# Patient Record
Sex: FEMALE | Race: BLACK OR AFRICAN AMERICAN | NOT HISPANIC OR LATINO | URBAN - METROPOLITAN AREA
[De-identification: names, ages, dates, MRNs, and addresses within clinical notes are randomized per-mention and may not be internally consistent; named-entity substitution may affect disease eponyms.]

---

## 2024-10-03 ENCOUNTER — OFFICE VISIT (OUTPATIENT)
Age: 39
End: 2024-10-03

## 2024-10-03 VITALS
WEIGHT: 293 LBS | BODY MASS INDEX: 44.41 KG/M2 | HEIGHT: 68 IN | SYSTOLIC BLOOD PRESSURE: 130 MMHG | DIASTOLIC BLOOD PRESSURE: 90 MMHG

## 2024-10-03 DIAGNOSIS — Z80.3 FAMILY HISTORY OF BREAST CANCER: ICD-10-CM

## 2024-10-03 DIAGNOSIS — Z11.3 SCREENING FOR STDS (SEXUALLY TRANSMITTED DISEASES): ICD-10-CM

## 2024-10-03 DIAGNOSIS — Z11.51 SCREENING FOR HUMAN PAPILLOMAVIRUS (HPV): ICD-10-CM

## 2024-10-03 DIAGNOSIS — E55.9 VITAMIN D DEFICIENCY: ICD-10-CM

## 2024-10-03 DIAGNOSIS — Z01.419 ENCOUNTER FOR ANNUAL ROUTINE GYNECOLOGICAL EXAMINATION: Primary | ICD-10-CM

## 2024-10-03 DIAGNOSIS — N95.1 HOT FLUSHES, PERIMENOPAUSAL: ICD-10-CM

## 2024-10-03 DIAGNOSIS — N93.9 ABNORMAL UTERINE BLEEDING (AUB): ICD-10-CM

## 2024-10-03 DIAGNOSIS — Z00.00 GENERAL MEDICAL EXAM: ICD-10-CM

## 2024-10-03 DIAGNOSIS — Z12.4 SCREENING FOR CERVICAL CANCER: ICD-10-CM

## 2024-10-03 DIAGNOSIS — Z80.3 FAMILY HISTORY OF BREAST CANCER IN MOTHER: ICD-10-CM

## 2024-10-03 LAB
EXTERNAL CHLAMYDIA RESULT: NEGATIVE
N GONORRHOEA RRNA SPEC QL PROBE: NEGATIVE

## 2024-10-03 RX ORDER — CETIRIZINE HYDROCHLORIDE 10 MG/1
10 TABLET ORAL DAILY
COMMUNITY

## 2024-10-03 NOTE — PATIENT INSTRUCTIONS
Pap today, with STD testing.   For mammogram at age 41.  For colon CA screening at age 46.   For genetic screening.   Future fertility and AUB:  for fasting day 3 labs and for u/s the week after your period.  For consult with Dr. Tara Budinetz.   For f/u to go over labs and next step in care.    Patient verbalized understanding of today's discussion with all questions and concerns addressed to her satisfaction.

## 2024-10-03 NOTE — PROGRESS NOTES
What we talked about today:    Patient Instructions   Pap today, with STD testing.   For mammogram at age 41.  For colon CA screening at age 46.   For genetic screening.   Future fertility and AUB:  for fasting day 3 labs and for u/s the week after your period.  For consult with Dr. Tara Budinetz.   For f/u to go over labs and next step in care.    Patient verbalized understanding of today's discussion with all questions and concerns addressed to her satisfaction.            Assessment/Plan:    1. Encounter for annual routine gynecological examination  2. Screening for cervical cancer  -     Liquid-based pap, screening  3. Screening for human papillomavirus (HPV)  -     Liquid-based pap, screening  4. Family history of breast cancer in mother  -     Ambulatory Referral to Genetics; Future  5. Family history of breast cancer  -     Ambulatory Referral to Genetics; Future  6. Vitamin D deficiency  -     Vitamin D 25 hydroxy; Future  -     Vitamin D 25 hydroxy  7. Abnormal uterine bleeding (AUB)  -     TSH, 3rd generation with Free T4 reflex; Future  -     Testosterone; Future  -     Prolactin; Future  -     Progesterone; Future  -     Luteinizing hormone; Future  -     Insulin, fasting; Future  -     Hemoglobin A1C; Future  -     Follicle stimulating hormone; Future  -     Glucose, fasting; Future  -     Estrogens, total; Future  -     DHEA-sulfate; Future  -     hCG, quantitative; Future  -     Antimullerian hormone (AMH); Future  -     US pelvis complete w transvaginal; Future; Expected date: 10/03/2024  -     TSH, 3rd generation with Free T4 reflex  -     Testosterone  -     Prolactin  -     Progesterone  -     Luteinizing hormone  -     Hemoglobin A1C  -     Follicle stimulating hormone  -     Glucose, fasting  -     Estrogens, total  -     DHEA-sulfate  -     hCG, quantitative  -     Antimullerian hormone (AMH)  -     CBC and differential; Future  -     CBC and differential  8. Hot flushes, perimenopausal  -      TSH, 3rd generation with Free T4 reflex; Future  -     Testosterone; Future  -     Prolactin; Future  -     Progesterone; Future  -     Luteinizing hormone; Future  -     Insulin, fasting; Future  -     Hemoglobin A1C; Future  -     Follicle stimulating hormone; Future  -     Glucose, fasting; Future  -     Estrogens, total; Future  -     DHEA-sulfate; Future  -     hCG, quantitative; Future  -     Antimullerian hormone (AMH); Future  -     TSH, 3rd generation with Free T4 reflex  -     Testosterone  -     Prolactin  -     Progesterone  -     Luteinizing hormone  -     Hemoglobin A1C  -     Follicle stimulating hormone  -     Glucose, fasting  -     Estrogens, total  -     DHEA-sulfate  -     hCG, quantitative  -     Antimullerian hormone (AMH)  9. General medical exam  -     CBC and differential; Future  -     Comprehensive metabolic panel; Future  -     TIBC Panel (incl. Iron, TIBC, % Iron Saturation); Future  -     Ferritin; Future  -     Lipid panel; Future  -     CBC and differential  -     Comprehensive metabolic panel  -     Ferritin  -     Lipid panel          Subjective:      Patient ID: Ivania Diallo is a 39 y.o. female, who presents for an annual exam today.     HPI:    Pt states here for an annual.   Pt reports several times a month where she describes immobilizing pain, thinks it has worsened over the past several years.      Interested in preserving fertility and possible early menopause.  Periods are irregular and hot flushes are getting worse. Daily now with hot flushes.  Periods last 7-10 days.  Went as long as 45 days between periods.      Interested in surrogacy.       GYN History:    Patient's last menstrual period was 2024.  Period Duration (Days): 7-10  Period Pattern: (!) Irregular  Menstrual Flow: Heavy  Dysmenorrhea: (!) Severe  Dysmenorrhea Symptoms: Cramping, Headache  OB History          2    Para   1    Term   1            AB   1    Living   1         SAB   1    IAB      "   Ectopic        Multiple        Live Births   1                  Last pap smear: 2022, wnl with Dr. Jacey Headley.  History of abnormal paps: Yes, describe: >5yrs ago.      Smoking/alcohol/drug use. Yes, describe: occa EtOH.     Exercise:  Yes, describe: chasing after 10yo.     Sun exposure: mild, No sunscreen use.    Seat belt use:  Yes , appropriate counseling reviewed.      Last saw Family Physician:  Tyrell Mccarthy <6mos ago for physical.      Up to date with vaccines:  Yes , including covid vaccine, not sure if got Gardasil series.     Last mammogram: ~10yrs ago for family h/o and dense breast tissue.     Last colonoscopy: never    The following portions of the patient's history were reviewed and updated as appropriate: allergies, current medications, past family history, past medical history, past social history, past surgical history, and problem list.      Family history:      Family history   is  positive for breast, uterine, ovarian cancer, colon cancer, or melanoma skin cancer.    Other family history of cancers:  Yes, describe: mom diagnosed @ age 65 with DCIS and had a double mastectomy.   Pat aunt with breast CA.        Review of Systems   Constitutional: Negative.    HENT: Negative.     Respiratory: Negative.     Cardiovascular: Negative.    Gastrointestinal: Negative.    Endocrine: Negative.    Genitourinary: Negative.    Musculoskeletal: Negative.    Skin: Negative.    Allergic/Immunologic: Negative.    Neurological: Negative.    Hematological: Negative.    Psychiatric/Behavioral: Negative.     All other systems reviewed and are negative.            Objective:      /90   Ht 5' 7.5\" (1.715 m)   Wt (!) 162 kg (357 lb 6.4 oz)   LMP 09/17/2024   BMI 55.15 kg/m²        Physical Exam  Constitutional:       Appearance: She is obese.   HENT:      Head: Normocephalic and atraumatic.   Eyes:      Extraocular Movements: Extraocular movements intact.   Cardiovascular:      Rate and Rhythm: Normal rate and " regular rhythm.   Pulmonary:      Effort: Pulmonary effort is normal.      Breath sounds: Normal breath sounds.   Abdominal:      General: Bowel sounds are normal.      Palpations: Abdomen is soft.   Musculoskeletal:      Cervical back: Neck supple.   Skin:     General: Skin is warm.   Neurological:      Mental Status: She is alert and oriented to person, place, and time.   Psychiatric:         Mood and Affect: Mood normal.         Behavior: Behavior normal.         Thought Content: Thought content normal.         Judgment: Judgment normal.         Cardiac:  murmur appreciated No    Breast exam:  large breast bilaterally, no lumps or masses noted.     GYN exam: NEFG, No CMT, uterine, or adnexal tenderness to palpation.  Difficult to fully appreciate 2/2 body habitus.     Wetprep was not performed today.            FERMIN Santos MD, FACOG

## 2024-10-16 ENCOUNTER — TELEPHONE (OUTPATIENT)
Age: 39
End: 2024-10-16

## 2024-10-21 LAB — HBA1C MFR BLD HPLC: 6 %

## 2024-10-22 LAB
ALBUMIN SERPL-MCNC: 3.8 G/DL (ref 3.9–4.9)
ALP SERPL-CCNC: 91 IU/L (ref 44–121)
ALT SERPL-CCNC: 20 IU/L (ref 0–32)
AST SERPL-CCNC: 22 IU/L (ref 0–40)
BASOPHILS # BLD AUTO: 0.1 X10E3/UL (ref 0–0.2)
BASOPHILS NFR BLD AUTO: 1 %
BILIRUB SERPL-MCNC: <0.2 MG/DL (ref 0–1.2)
BUN SERPL-MCNC: 13 MG/DL (ref 6–20)
BUN/CREAT SERPL: 15 (ref 9–23)
CALCIUM SERPL-MCNC: 8.6 MG/DL (ref 8.7–10.2)
CHLORIDE SERPL-SCNC: 105 MMOL/L (ref 96–106)
CHOLEST SERPL-MCNC: 186 MG/DL (ref 100–199)
CHOLEST/HDLC SERPL: 5 RATIO (ref 0–4.4)
CO2 SERPL-SCNC: 23 MMOL/L (ref 20–29)
CREAT SERPL-MCNC: 0.89 MG/DL (ref 0.57–1)
EGFR: 85 ML/MIN/1.73
EOSINOPHIL # BLD AUTO: 0.6 X10E3/UL (ref 0–0.4)
EOSINOPHIL NFR BLD AUTO: 8 %
ERYTHROCYTE [DISTWIDTH] IN BLOOD BY AUTOMATED COUNT: 12.6 % (ref 11.7–15.4)
FERRITIN SERPL-MCNC: 17 NG/ML (ref 15–150)
GLOBULIN SER-MCNC: 2.4 G/DL (ref 1.5–4.5)
GLUCOSE SERPL-MCNC: 96 MG/DL (ref 70–99)
HCT VFR BLD AUTO: 37.9 % (ref 34–46.6)
HDLC SERPL-MCNC: 37 MG/DL
HGB BLD-MCNC: 11.8 G/DL (ref 11.1–15.9)
IMM GRANULOCYTES # BLD: 0 X10E3/UL (ref 0–0.1)
IMM GRANULOCYTES NFR BLD: 0 %
LDLC SERPL CALC-MCNC: 135 MG/DL (ref 0–99)
LYMPHOCYTES # BLD AUTO: 2 X10E3/UL (ref 0.7–3.1)
LYMPHOCYTES NFR BLD AUTO: 26 %
MCH RBC QN AUTO: 27.8 PG (ref 26.6–33)
MCHC RBC AUTO-ENTMCNC: 31.1 G/DL (ref 31.5–35.7)
MCV RBC AUTO: 89 FL (ref 79–97)
MONOCYTES # BLD AUTO: 0.6 X10E3/UL (ref 0.1–0.9)
MONOCYTES NFR BLD AUTO: 7 %
NEUTROPHILS # BLD AUTO: 4.6 X10E3/UL (ref 1.4–7)
NEUTROPHILS NFR BLD AUTO: 58 %
PLATELET # BLD AUTO: 354 X10E3/UL (ref 150–450)
POTASSIUM SERPL-SCNC: 4.3 MMOL/L (ref 3.5–5.2)
PROT SERPL-MCNC: 6.2 G/DL (ref 6–8.5)
RBC # BLD AUTO: 4.25 X10E6/UL (ref 3.77–5.28)
SL AMB VLDL CHOLESTEROL CALC: 14 MG/DL (ref 5–40)
SODIUM SERPL-SCNC: 140 MMOL/L (ref 134–144)
TRIGL SERPL-MCNC: 76 MG/DL (ref 0–149)
WBC # BLD AUTO: 7.9 X10E3/UL (ref 3.4–10.8)

## 2024-10-24 ENCOUNTER — TELEPHONE (OUTPATIENT)
Age: 39
End: 2024-10-24

## 2024-10-24 NOTE — TELEPHONE ENCOUNTER
"----- Message from FERMIN Santos MD sent at 10/24/2024  8:50 AM EDT -----  Any chance you can call pt to get her on the [id number] to make sending messages to her easier?    Elevated \"bad\" cholesterol. Otherwise, rest of labs pending.     //RMR    "

## 2024-10-26 LAB
25(OH)D3+25(OH)D2 SERPL-MCNC: 19.5 NG/ML (ref 30–100)
DHEA-S SERPL-MCNC: 123 UG/DL (ref 57.3–279.2)
EST. AVERAGE GLUCOSE BLD GHB EST-MCNC: 126 MG/DL
ESTROGEN SERPL-MCNC: 105 PG/ML
FSH SERPL-ACNC: 6.4 MIU/ML
GLUCOSE SERPL-MCNC: 96 MG/DL (ref 70–99)
HBA1C MFR BLD: 6 % (ref 4.8–5.6)
HCG INTACT+B SERPL-ACNC: <1 MIU/ML
LH SERPL-ACNC: 8.1 MIU/ML
MIS SERPL-MCNC: 2.67 NG/ML
PROGEST SERPL-MCNC: 0.2 NG/ML
PROLACTIN SERPL-MCNC: 33 NG/ML (ref 4.8–33.4)
TESTOST SERPL-MCNC: 19 NG/DL (ref 8–60)
TSH SERPL DL<=0.005 MIU/L-ACNC: 1.87 UIU/ML (ref 0.45–4.5)

## 2024-10-31 ENCOUNTER — HOSPITAL ENCOUNTER (OUTPATIENT)
Dept: ULTRASOUND IMAGING | Facility: CLINIC | Age: 39
Discharge: HOME | End: 2024-10-31
Payer: COMMERCIAL

## 2024-10-31 DIAGNOSIS — N93.9 ABNORMAL UTERINE BLEEDING (AUB): ICD-10-CM

## 2024-10-31 PROCEDURE — 76830 TRANSVAGINAL US NON-OB: CPT

## 2024-10-31 PROCEDURE — 76856 US EXAM PELVIC COMPLETE: CPT

## 2024-11-06 ENCOUNTER — OFFICE VISIT (OUTPATIENT)
Age: 39
End: 2024-11-06
Payer: COMMERCIAL

## 2024-11-06 VITALS — BODY MASS INDEX: 55.12 KG/M2 | SYSTOLIC BLOOD PRESSURE: 130 MMHG | WEIGHT: 293 LBS | DIASTOLIC BLOOD PRESSURE: 88 MMHG

## 2024-11-06 DIAGNOSIS — Z31.62 ENCOUNTER FOR FERTILITY PRESERVATION COUNSELING: Primary | ICD-10-CM

## 2024-11-06 DIAGNOSIS — Z87.42 HISTORY OF PCOS: ICD-10-CM

## 2024-11-06 PROCEDURE — 99213 OFFICE O/P EST LOW 20 MIN: CPT | Performed by: OBSTETRICS & GYNECOLOGY

## 2024-11-06 NOTE — PROGRESS NOTES
What we talked about today:    Patient Instructions   Labs and u/s reviewed.   Concern of EL of 2cm.    Will await [facility] recommendations.  Do advise considering a hysteroscopy, D&C.   Pt to call after F appt for egg preservation.   Patient verbalized understanding of today's discussion with all questions and concerns addressed to her satisfaction.            Assessment/Plan:    1. Encounter for fertility preservation counseling  2. History of PCOS          Subjective:      Patient ID: Ivania Diallo is a 39 y.o. female, presents today complaining of f/u labs.      HPI:    Pt states here for results and next step in care.  LMP of 10/19 x 7 days.  Heavy period.  Periods are mostly every month.  Skipped for several months but not usual.     Meeting with F on 11/14 to discuss egg freezing.  Interested in surrogate carrier for future pregnancies.      AMH-2.67  FSH-6.4  Test-19  Glc-96  A1c-6.0%  P4-0.2  E2-105  TSH-1.87  Vit D-19.5    Lipid panel: LDL-135, HDL-37,     10/31 u/s showed:   FINDINGS:     UTERUS:  The uterus is anteverted in position, measuring 8.9 x 4.5 x 5.7 cm.  The uterus has a normal contour and echotexture. Several fibroids in the fundus measuring up to 1.5 cm. Not well visualized due to posterior location.  The cervix appears within normal limits.     ENDOMETRIUM:  The endometrial echo complex has an AP caliber of 20.0 mm.  Not well visualized.     OVARIES/ADNEXA:  Right ovary: 2.4 x 2.2 x 2.0 cm. 5.4 mL.  Ovarian Doppler flow is within normal limits.  No suspicious ovarian or adnexal abnormality.     Left ovary: 4.4 x 3.5 x 2.2 cm. 17.8 mL.  Ovarian Doppler flow is within normal limits.  No suspicious ovarian or adnexal abnormality.     OTHER:  No free fluid or loculated fluid collections.     IMPRESSION:     1.  Few small uterine fibroids.  2.  Endometrium not well visualized due to uterine positioning, approximately 20 mm in thickness.          The following portions of the patient's history were  reviewed and updated as appropriate: allergies, current medications, past family history, past medical history, past social history, past surgical history, and problem list.      Review of Systems   Constitutional:  Negative for chills, fatigue and fever.   Respiratory: Negative.     Cardiovascular: Negative.    Gastrointestinal: Negative.    Endocrine: Negative.    Genitourinary: Negative.    Musculoskeletal: Negative.    Skin: Negative.    Allergic/Immunologic: Negative.    Neurological: Negative.    Hematological: Negative.    Psychiatric/Behavioral: Negative.               Objective:      /88 (BP Location: Left arm, Patient Position: Sitting, Cuff Size: Large)   Wt (!) 162 kg (357 lb 3.2 oz)   LMP 10/19/2024   BMI 55.12 kg/m²        Physical Exam  Vitals reviewed.   Constitutional:       General: She is not in acute distress.     Appearance: Normal appearance. She is not ill-appearing.   HENT:      Head: Normocephalic and atraumatic.   Eyes:      Extraocular Movements: Extraocular movements intact.   Musculoskeletal:      Cervical back: Normal range of motion.   Skin:     General: Skin is warm and dry.   Neurological:      Mental Status: She is alert.   Psychiatric:         Mood and Affect: Mood normal.         Behavior: Behavior normal.         Thought Content: Thought content normal.         Judgment: Judgment normal.         GYN exam: deferred        R Reba Santos MD, FACOG

## 2024-11-06 NOTE — PATIENT INSTRUCTIONS
Labs and u/s reviewed.   Concern of EL of 2cm.    Will await [facility] recommendations.  Do advise considering a hysteroscopy, D&C.   Pt to call after [facility] appt for egg preservation.   Patient verbalized understanding of today's discussion with all questions and concerns addressed to her satisfaction.

## 2024-11-13 ENCOUNTER — TELEPHONE (OUTPATIENT)
Age: 39
End: 2024-11-13

## 2024-11-13 NOTE — TELEPHONE ENCOUNTER
Pt stated that [facility] needs her medical records asap. Her appt is at 11 am tomorrow.

## 2024-11-13 NOTE — TELEPHONE ENCOUNTER
"Attempted to speak with patient, and notified patient that medical records requests unfortunately need to go the medical records department, and patient did not agree with office. Patient was unable to access [employer] to download records, and refused to take MR Department phone number. Office attempted to notify patient that office could fax any [facility]'s labwork, but patient was upset that office had not faxed over everything as patient had been \"promised by the office to send over medical records before appointment\". Office notified patient that unfortunately it is against [facility] policy to send medical records from an office to an external location, and that patient would need to contact medical records department. Patient was not satisfied with office care, and disconnected call. Office clerical staff spoke with Medical Assistants who notified clerical staff that unfortunately, if labs are from [facility], [facility] CANNOT fax over external labs to another external location. Office could not relay [facility] information to patient in time.  "

## 2024-11-15 ENCOUNTER — TELEPHONE (OUTPATIENT)
Age: 39
End: 2024-11-15

## 2024-11-15 NOTE — TELEPHONE ENCOUNTER
Pt called looking to schedule hysteroscopy, D&C, as recommended by Dr. Reba Santos during office visit on 11/6/24. Please reach out at your earliest convenience to schedule a pre op visit. Thank you.

## 2024-11-18 NOTE — TELEPHONE ENCOUNTER
Spoke with pt and scheduled her for an appt with Dr. Santos to for her preop appt and to sign consents.

## 2024-11-21 ENCOUNTER — CONSULT (OUTPATIENT)
Age: 39
End: 2024-11-21
Payer: COMMERCIAL

## 2024-11-21 VITALS
BODY MASS INDEX: 44.41 KG/M2 | DIASTOLIC BLOOD PRESSURE: 86 MMHG | SYSTOLIC BLOOD PRESSURE: 128 MMHG | HEIGHT: 68 IN | WEIGHT: 293 LBS

## 2024-11-21 DIAGNOSIS — R93.89 ENDOMETRIAL THICKENING ON ULTRASOUND: ICD-10-CM

## 2024-11-21 DIAGNOSIS — E66.813 CLASS 3 OBESITY: ICD-10-CM

## 2024-11-21 DIAGNOSIS — R73.09 ELEVATED HEMOGLOBIN A1C: ICD-10-CM

## 2024-11-21 DIAGNOSIS — Z01.818 PREOP EXAMINATION: Primary | ICD-10-CM

## 2024-11-21 PROCEDURE — 99214 OFFICE O/P EST MOD 30 MIN: CPT | Performed by: OBSTETRICS & GYNECOLOGY

## 2024-11-21 RX ORDER — TIRZEPATIDE 2.5 MG/.5ML
2.5 INJECTION, SOLUTION SUBCUTANEOUS WEEKLY
Qty: 2 ML | Refills: 5 | Status: SHIPPED | OUTPATIENT
Start: 2024-11-21 | End: 2025-05-20

## 2024-11-21 RX ORDER — TIRZEPATIDE 2.5 MG/.5ML
2.5 INJECTION, SOLUTION SUBCUTANEOUS WEEKLY
Qty: 2 ML | Refills: 5 | Status: SHIPPED | OUTPATIENT
Start: 2024-11-21 | End: 2024-11-21

## 2024-11-21 NOTE — PROGRESS NOTES
Pre-op History and Physical  Patient is a 39 y.o. female scheduled for EUA, hysteroscopy, D&C. Indications for procedure are thickened endometrium on u/s.    Pt has always had irregular and heavy periods. Symptoms have been stable since that time.     Periods are irregular and hot flushes are getting worse. Daily now with hot flushes.  Periods last 7-10 days.  Went as long as 45 days between periods.        10/31/24 u/s showed:   FINDINGS:     UTERUS:  The uterus is anteverted in position, measuring 8.9 x 4.5 x 5.7 cm.  The uterus has a normal contour and echotexture. Several fibroids in the fundus measuring up to 1.5 cm. Not well visualized due to posterior location.  The cervix appears within normal limits.     ENDOMETRIUM:  The endometrial echo complex has an AP caliber of 20.0 mm.  Not well visualized.     OVARIES/ADNEXA:  Right ovary: 2.4 x 2.2 x 2.0 cm. 5.4 mL.  Ovarian Doppler flow is within normal limits.  No suspicious ovarian or adnexal abnormality.     Left ovary: 4.4 x 3.5 x 2.2 cm. 17.8 mL.  Ovarian Doppler flow is within normal limits.  No suspicious ovarian or adnexal abnormality.     OTHER:  No free fluid or loculated fluid collections.     IMPRESSION:     1.  Few small uterine fibroids.  2.  Endometrium not well visualized due to uterine positioning, approximately 20 mm in thickness.      Met with Dr. Budinetz for egg preservation.        Pertinent Gynecological History:    Menses: irregular occurring approximately every 28-45 days with spotting approximately 5-7 days per month  Bleeding: dysfunctional uterine bleeding  Contraception: none  ELLIOT exposure: denies  Blood transfusions: none  Sexually transmitted diseases: no past history  Preventive screening: 10/2024 pap:  neg/neg  Last mammogram:     Discussed Blood/Blood Products: yes    Menstrual History:  OB History          2    Para   1    Term   1            AB   1    Living   1         SAB   1    IAB        Ectopic        Multiple         Live Births   1                Menarche age: ~13yo  Patient's last menstrual period was 10/19/2024.       Family History   Problem Relation Age of Onset    Breast cancer Mother 66        DCIS-resolved w/surgery    Miscarriages / Stillbirths Mother     Diabetes Father     Hypertension Father     Diabetes Paternal Grandmother     Stroke Paternal Grandfather     Breast cancer Paternal Aunt      Past Medical History:   Diagnosis Date    Abnormal Pap smear of cervix     Anemia     Asthma     Fibroid     Herpes     Migraine     Miscarriage     Varicella        Current Outpatient Medications:     cetirizine (ZyrTEC) 10 mg tablet, Take 10 mg by mouth daily, Disp: , Rfl:     Tirzepatide (Mounjaro) 2.5 MG/0.5ML SOAJ, Inject 2.5 mg under the skin once a week, Disp: 2 mL, Rfl: 5     Allergies   Allergen Reactions    Penicillins Anaphylaxis, Hives, Itching, Other (See Comments), Palpitations, Rash, Shortness Of Breath and Swelling    Cyclobenzaprine Other (See Comments) and Irritability     sensitivity    Methylprednisolone Other (See Comments)     sensitivity    Prednisone Other (See Comments)     Physical Exam  HENT:      Head: Normocephalic and atraumatic.   Cardiovascular:      Rate and Rhythm: Normal rate and regular rhythm.   Pulmonary:      Effort: Pulmonary effort is normal.      Breath sounds: Normal breath sounds.   Abdominal:      Palpations: Abdomen is soft.   Neurological:      Mental Status: She is alert and oriented to person, place, and time.   Psychiatric:         Mood and Affect: Mood normal.         Behavior: Behavior normal.         Thought Content: Thought content normal.         Judgment: Judgment normal.       A/P:      Thickened endometrium on u/s: for EUA, hysteroscopy, D&C, and all other indicated procedures.   Met with Dr. Budinetz for egg preservation.   Plan for wt loss prior to egg preservation (~100 lbs),  eRX for 2.5mg Mounjaro sent.    Patient verbalized understanding of today's discussion  with all questions and concerns addressed to her satisfaction.        R Reba Santos MD, FACOG

## 2024-11-21 NOTE — PATIENT INSTRUCTIONS
Thickened endometrium on u/s: for EUA, hysteroscopy, D&C, and all other indicated procedures.  Consents signed today.   Met with Dr. Budinetz for egg preservation.   Plan for wt loss prior to egg preservation (~100 lbs),  eRX for 2.5mg Mounjaro sent.    Patient verbalized understanding of today's discussion with all questions and concerns addressed to her satisfaction.

## 2024-11-25 DIAGNOSIS — R73.09 ELEVATED HEMOGLOBIN A1C: ICD-10-CM

## 2024-11-25 DIAGNOSIS — E66.813 CLASS 3 OBESITY: ICD-10-CM

## 2024-12-04 ENCOUNTER — TELEPHONE (OUTPATIENT)
Age: 39
End: 2024-12-04

## 2024-12-04 NOTE — TELEPHONE ENCOUNTER
Pt is scheduled for 2/12/25 @ [facility] with Dr. Santos.    I did offer pt 12/20/24 and 12/31/24 but due to work obligations pt could not do those dates.

## 2024-12-04 NOTE — TELEPHONE ENCOUNTER
----- Message from FERMIN Santos MD sent at 2024  2:48 AM EST -----  Regarding: sx  [facility] GYN Department  Surgery Scheduling Sheet    Patient Name: Ivania Diallo  : 1985    Provider: SÁNCHEZ Santos MD     Needed: no; Language: N/A    Procedure: exam under anesthesia, all other indicated procedures, dilation and curettage , and hysteroscopy    Diagnosis: thickened endometrium on u/s.    Special Needs or Equipment: symphion    Anesthesia: IV sedation with anesthesia    Length of stay: outpatient  Does patient have comorbid conditions that will require close perioperative monitoring prior to safe discharge: no    The patient has comorbid conditions that will require close perioperative monitoring prior to safe discharge, including N/A.   This may require acute care beyond the usual and routine recovery period. As such, inpatient admission post-operatively is expected and appropriate, and anticipated hospital length of stay will be >2 midnights.    Pre-Admission Testing Needed: yes   Labs that should be ordered: cbc, type and screen, and urine pregnancy test    Order PAT that is recommended in prep for procedure?: Yes    Medical Clearance Needed: no; Provider: N/A    MA Form Signed (tubals/hysterectomy): Not Indicated    Surgical Drink Given: no     How many days out of work: 2 day(s)     How many days no drivin day(s)       Is pre op appt needed?  no  Interval for post op appt: 4 week(s)       For Surgical Scheduler:     Surgery Scheduled On:  [place]: [facility]    Pre-op Appt:   Post op Appt:  Consult/Medical clearance appt:

## 2024-12-18 DIAGNOSIS — R73.09 ABNORMAL GLUCOSE LEVEL: Primary | ICD-10-CM

## 2024-12-18 DIAGNOSIS — R69 MULTIPLE COMORBID CONDITIONS: ICD-10-CM

## 2024-12-18 DIAGNOSIS — E66.01 MORBID OBESITY (HCC): ICD-10-CM

## 2024-12-18 NOTE — TELEPHONE ENCOUNTER
Requested medication(s) are due for refill today: No  Patient has already received a courtesy refill: No  Other reason request has been forwarded to provider:     Mounjaro on Back Order.  Start on Ozempic.

## 2024-12-20 RX ORDER — TIRZEPATIDE 2.5 MG/.5ML
2.5 INJECTION, SOLUTION SUBCUTANEOUS WEEKLY
Qty: 2 ML | Refills: 0 | Status: SHIPPED | OUTPATIENT
Start: 2024-12-20 | End: 2025-06-18

## 2025-01-22 ENCOUNTER — DOCUMENTATION (OUTPATIENT)
Dept: GENETICS | Facility: CLINIC | Age: 40
End: 2025-01-22

## 2025-01-23 ENCOUNTER — TELEPHONE (OUTPATIENT)
Dept: GENETICS | Facility: CLINIC | Age: 40
End: 2025-01-23

## 2025-01-23 ENCOUNTER — TELEPHONE (OUTPATIENT)
Dept: HEMATOLOGY ONCOLOGY | Facility: CLINIC | Age: 40
End: 2025-01-23

## 2025-01-23 ENCOUNTER — OFFICE VISIT (OUTPATIENT)
Dept: GENETICS | Facility: CLINIC | Age: 40
End: 2025-01-23

## 2025-01-23 DIAGNOSIS — Z80.42 FAMILY HISTORY OF MALIGNANT NEOPLASM OF PROSTATE: ICD-10-CM

## 2025-01-23 DIAGNOSIS — Z80.3 FAMILY HISTORY OF BREAST CANCER: Primary | ICD-10-CM

## 2025-01-23 DIAGNOSIS — Z80.3 FAMILY HISTORY OF BREAST CANCER IN MOTHER: ICD-10-CM

## 2025-01-23 NOTE — TELEPHONE ENCOUNTER
Referral to Surgical Oncology received.  Chart reviewed by  for Surgical oncology at this time.       Diagnosis:   Z80.3 (ICD-10-CM) - Family history of breast cancer in mother  Z80.3 (ICD-10-CM) - Family history of breast cancer     After review of chart, instructions for scheduling added to referral and sent to be scheduled as advised.

## 2025-01-23 NOTE — TELEPHONE ENCOUNTER
Phone call was dropped during telephone genetic counseling appointment. Left voicemail for patient with my direct number encouraging a call back to continue genetic counseling appointment.

## 2025-01-23 NOTE — PROGRESS NOTES
"Pre-Test Genetic Counseling Consult Note    Patient Name: Ivania Diallo   /Age: 1985/39 y.o.  Referring Provider:  FERMIN Santos MD    Date of Service: 2025  Genetic Counselor: Ingrid Sanderson MS, [facility]  Interpretation Services: None  Location: Telephone consult   Length of Visit: 60 Minutes    Ivania was referred to the [facility] Cancer Risk and Genetic Assessment Program due to her family history of cancer . Ivania presents today to discuss the possibility of a hereditary cancer syndrome, options for genetic testing, and implications for her and her family.    Cancer History and Treatment:   Personal History:   Oncology History    No history exists.     Screening Hx:   Breast:  Breast Imaging: Mammogram ~10 years due to lumps felt during clinical breast exam at outside facility - returned normal per patient     Breast Density: Unknown    Colon:  Colonoscopy: None    Gynecologic:  Ovaries and Uterus intact     Skin:  No current screening    Other screening: None    Reproductive History  Age at menarche: 12  Age at first live birth:  30  Menopause: Premenopausal  Hormone replacement: None    Medical and Surgical History  Pertinent surgical history:   Past Surgical History:   Procedure Laterality Date     SECTION        Pertinent medical history:  Past Medical History:   Diagnosis Date    Abnormal Pap smear of cervix     Anemia     Asthma     Fibroid     Herpes     Migraine     Miscarriage     Varicella        Other History:  Height:   Ht Readings from Last 1 Encounters:   24 5' 7.5\" (1.715 m)     Weight:   Wt Readings from Last 1 Encounters:   24 (!) 164 kg (360 lb 9.6 oz)     Relevant Family History   Patient reports no Ashkenazi Alevism ancestry.         Please refer to the scanned pedigree in the Media Tab for a complete family history     *All history is reported as provided by the patient; records are not available for review, except where indicated.     Assessment:  We discussed " sporadic, familial and hereditary cancer.  We also discussed the many factors that influence our risk for cancer such as age, environmental exposures, lifestyle choices and family history.      We reviewed the indications suggestive of a hereditary predisposition to cancer.    Of note, while testing an affected family member is most informative, it is also appropriate to test unaffected family members who meet testing criteria (NCCN Guidelines for Genetic/Familial High-Risk Assessment: Breast, Ovarian, and Pancreatic).      Genetic testing is indicated for Ivania based on the following criteria: Meets NCCN V2.2025 Testing Criteria for High-Penetrance Breast Cancer Susceptibility Genes: Patient has a second degree relative (paternal aunt) with breast cancer diagnosed at age 50 years  Meets NCCN V2.2025 Testing Criteria for Prostate Cancer Susceptibility Genes: Patient has a first degree relative (father) with prostate cancer who has a sister with breast cancer diagnosed at age 50 years    The risks, benefits, and limitations of genetic testing were reviewed with the patient, as well as genetic discrimination laws, and possible test results (positive, negative, variants of uncertain significance) and their clinical implications. If positive for a mutation, options for managing cancer risk including increased surveillance, chemoprevention, and in some cases prophylactic surgery were discussed. Ivania was informed that if a hereditary cancer syndrome was identified in her, first degree relatives (parents, siblings, and children) have a chance of also inheriting the condition. Genetic testing would allow for predictive genetic testing in other relatives, who may also be at risk depending on their degree of relation.     Residual Risk for Breast Cancer:  Ivania and I reviewed that even if her genetic testing were to return negative, she would still be at an increased risk for breast cancer given her family history. Based on  her reported family history, Ivania's estimated residual lifetime risk for breast cancer is 20% or higher, which puts her in a high-risk category. The National Comprehensive Cancer Network (NCCN) recommends that women at high risk for breast cancer follow the below screening interval.  For this reason, I am placing a referral for Ivania to Surgical Oncology to meet with a high risk provider to discuss increased breast cancer screening. If Ivania is positive for a high-risk breast cancer gene, a referral to this provider would be recommended regardless.    Annamariesterling Simentalcorneliushailey Risk Model Score:  27.6%    Breast cancer screening per the NCCN Breast Cancer Risk Reduction as of 01/23/25  Screening for women who have a lifetime risk of >20% as defined by models that are largely dependent on family history:  Breast awareness  Clinical encounter every 6-12 months   Consider referral to a breast specialist   Annual screening mammogram.  Tomosynthesis is recommended if available beginning 10 years prior to the youngest family member but not less than age 30 years  Annual breast MRI to begin 10 years prior to the youngest family member but not less than age 25 years  Consider whole breast ultrasound or contrast-enhanced mammography for those who qualify for but cannot undergo MRI    Orders Placed:  Ambulatory Referral to Surgical Oncology    Billing:  Most individuals pay <$100 for hereditary cancer genetic testing. If insurance covers the cost of the testing, individuals may still pay out of pocket secondary to co-pays, co-insurance, or deductibles. If the cost of the testing exceeds $100, the lab will reach out to the patient via phone or e-mail. The patient will then have the option to proceed with the testing, cancel the testing, or elect the self-pay option of $250. Ivania verbalized understanding.     Plan: Patient decided to proceed with testing and provided consent.    Summary:     Sample Collection:  An order was placed and the  patient was instructed to go to a [facility]'s lab for a blood collection    Genetic Testing Preformed: CustomNext: Cancer® +RNAinsight® (59 genes): APC, LISBETH, AXIN2, BAP1, BARD1, BMPR1A, BRCA1, BRCA2, BRIP1, CDH1, CDK4, CDKN1B, CDKN2A, CHEK2, CTNNA1, DICER1, EGLN1, EPCAM, FH, FLCN, GREM1, HOXB13, KIF1B, KIT, MAX, MEN1, MET, MITF, MLH1, MSH2, MSH3, MSH6, MUTYH, NF1, NTHL1, PALB2, PDGFRA PMS2, POLD1, POLE, POT1, PTEN, RAD51C, RAD51D, RB1, RET, SDHA, SDHAF2, SDHB, SDHC, SDHD, SMAD4, SMARCA4, STK11, WPJF544, TP53, TSC1, TSC2, VHL     Result Call Information:  In the event that we need to reach [facility] via telephone:  I confirmed the patient's mobile number on file as the best number to call with results  I confirmed with the patient that we can leave a voicemail on the provided numbers    Results take approximately 2-3 weeks to complete once test is started.    [facility] will be notified via phone once results are available.      Additional recommendations for surveillance/medical management will be made pending genetic test results.

## 2025-02-01 ENCOUNTER — OFFICE VISIT (OUTPATIENT)
Dept: LAB | Facility: HOSPITAL | Age: 40
End: 2025-02-01
Payer: COMMERCIAL

## 2025-02-01 ENCOUNTER — APPOINTMENT (OUTPATIENT)
Dept: LAB | Facility: HOSPITAL | Age: 40
End: 2025-02-01
Payer: COMMERCIAL

## 2025-02-01 DIAGNOSIS — Z80.3 FAMILY HISTORY OF BREAST CANCER IN MOTHER: ICD-10-CM

## 2025-02-01 DIAGNOSIS — N95.1 HOT FLUSHES, PERIMENOPAUSAL: ICD-10-CM

## 2025-02-01 DIAGNOSIS — Z80.42 FAMILY HISTORY OF MALIGNANT NEOPLASM OF PROSTATE: ICD-10-CM

## 2025-02-01 DIAGNOSIS — R93.89 ENDOMETRIAL THICKENING ON ULTRASOUND: Primary | ICD-10-CM

## 2025-02-01 DIAGNOSIS — N93.9 ABNORMAL UTERINE BLEEDING (AUB): ICD-10-CM

## 2025-02-01 DIAGNOSIS — R93.89 ENDOMETRIAL THICKENING ON ULTRASOUND: ICD-10-CM

## 2025-02-01 DIAGNOSIS — Z80.3 FAMILY HISTORY OF BREAST CANCER: ICD-10-CM

## 2025-02-01 DIAGNOSIS — Z00.00 GENERAL MEDICAL EXAM: ICD-10-CM

## 2025-02-01 LAB
ABO GROUP BLD: NORMAL
ANION GAP SERPL CALCULATED.3IONS-SCNC: 5 MMOL/L (ref 4–13)
BASOPHILS # BLD AUTO: 0.06 THOUSANDS/ÂΜL (ref 0–0.1)
BASOPHILS NFR BLD AUTO: 1 % (ref 0–1)
BLD GP AB SCN SERPL QL: NEGATIVE
BUN SERPL-MCNC: 10 MG/DL (ref 5–25)
CALCIUM SERPL-MCNC: 8.6 MG/DL (ref 8.4–10.2)
CHLORIDE SERPL-SCNC: 109 MMOL/L (ref 96–108)
CO2 SERPL-SCNC: 25 MMOL/L (ref 21–32)
CREAT SERPL-MCNC: 0.83 MG/DL (ref 0.6–1.3)
EOSINOPHIL # BLD AUTO: 0.89 THOUSAND/ÂΜL (ref 0–0.61)
EOSINOPHIL NFR BLD AUTO: 13 % (ref 0–6)
ERYTHROCYTE [DISTWIDTH] IN BLOOD BY AUTOMATED COUNT: 14 % (ref 11.6–15.1)
GFR SERPL CREATININE-BSD FRML MDRD: 89 ML/MIN/1.73SQ M
GLUCOSE P FAST SERPL-MCNC: 96 MG/DL (ref 65–99)
HCT VFR BLD AUTO: 38 % (ref 34.8–46.1)
HGB BLD-MCNC: 11.8 G/DL (ref 11.5–15.4)
IMM GRANULOCYTES # BLD AUTO: 0.03 THOUSAND/UL (ref 0–0.2)
IMM GRANULOCYTES NFR BLD AUTO: 0 % (ref 0–2)
LYMPHOCYTES # BLD AUTO: 1.9 THOUSANDS/ÂΜL (ref 0.6–4.47)
LYMPHOCYTES NFR BLD AUTO: 27 % (ref 14–44)
MCH RBC QN AUTO: 27.4 PG (ref 26.8–34.3)
MCHC RBC AUTO-ENTMCNC: 31.1 G/DL (ref 31.4–37.4)
MCV RBC AUTO: 88 FL (ref 82–98)
MONOCYTES # BLD AUTO: 0.56 THOUSAND/ÂΜL (ref 0.17–1.22)
MONOCYTES NFR BLD AUTO: 8 % (ref 4–12)
NEUTROPHILS # BLD AUTO: 3.53 THOUSANDS/ÂΜL (ref 1.85–7.62)
NEUTS SEG NFR BLD AUTO: 51 % (ref 43–75)
NRBC BLD AUTO-RTO: 0 /100 WBCS
PLATELET # BLD AUTO: 365 THOUSANDS/UL (ref 149–390)
PMV BLD AUTO: 9.3 FL (ref 8.9–12.7)
POTASSIUM SERPL-SCNC: 4.1 MMOL/L (ref 3.5–5.3)
RBC # BLD AUTO: 4.31 MILLION/UL (ref 3.81–5.12)
RH BLD: POSITIVE
SODIUM SERPL-SCNC: 139 MMOL/L (ref 135–147)
SPECIMEN EXPIRATION DATE: NORMAL
WBC # BLD AUTO: 6.97 THOUSAND/UL (ref 4.31–10.16)

## 2025-02-01 PROCEDURE — 86900 BLOOD TYPING SEROLOGIC ABO: CPT

## 2025-02-01 PROCEDURE — 80048 BASIC METABOLIC PNL TOTAL CA: CPT

## 2025-02-01 PROCEDURE — 86850 RBC ANTIBODY SCREEN: CPT

## 2025-02-01 PROCEDURE — 85025 COMPLETE CBC W/AUTO DIFF WBC: CPT

## 2025-02-01 PROCEDURE — 86901 BLOOD TYPING SEROLOGIC RH(D): CPT

## 2025-02-01 PROCEDURE — 36415 COLL VENOUS BLD VENIPUNCTURE: CPT

## 2025-02-01 PROCEDURE — 93005 ELECTROCARDIOGRAM TRACING: CPT

## 2025-02-05 LAB
ATRIAL RATE: 74 BPM
P AXIS: 19 DEGREES
PR INTERVAL: 170 MS
QRS AXIS: 5 DEGREES
QRSD INTERVAL: 88 MS
QT INTERVAL: 394 MS
QTC INTERVAL: 438 MS
T WAVE AXIS: 9 DEGREES
VENTRICULAR RATE: 74 BPM

## 2025-02-05 PROCEDURE — 93010 ELECTROCARDIOGRAM REPORT: CPT | Performed by: INTERNAL MEDICINE

## 2025-02-11 ENCOUNTER — ANESTHESIA EVENT (OUTPATIENT)
Dept: PERIOP | Facility: HOSPITAL | Age: 40
End: 2025-02-11
Payer: COMMERCIAL

## 2025-02-11 PROBLEM — K21.9 GASTROESOPHAGEAL REFLUX DISEASE: Status: ACTIVE | Noted: 2025-02-11

## 2025-02-11 PROBLEM — J45.909 ASTHMA: Status: ACTIVE | Noted: 2025-02-11

## 2025-02-11 PROBLEM — G47.30 SLEEP APNEA: Status: ACTIVE | Noted: 2025-02-11

## 2025-02-11 PROBLEM — E66.01 MORBID OBESITY (HCC): Status: ACTIVE | Noted: 2025-02-11

## 2025-02-11 RX ORDER — ALBUTEROL SULFATE 90 UG/1
2 INHALANT RESPIRATORY (INHALATION) EVERY 6 HOURS PRN
COMMUNITY

## 2025-02-11 NOTE — PRE-PROCEDURE INSTRUCTIONS
Pre-Surgery Instructions:   Medication Instructions    albuterol (PROVENTIL HFA,VENTOLIN HFA) 90 mcg/act inhaler Uses PRN- OK to take day of surgery    cetirizine (ZyrTEC) 10 mg tablet Uses PRN- OK to take day of surgery   Medication instructions for day surgery reviewed. Please use only a sip of water to take your instructed medications. Avoid all over the counter vitamins, supplements and NSAIDS for one week prior to surgery per anesthesia guidelines. Tylenol is ok to take as needed.     You will receive a call one business day prior to surgery with an arrival time and hospital directions. If your surgery is scheduled on a Monday, the hospital will be calling you on the Friday prior to your surgery. If you have not heard from anyone by 8pm, please call the hospital supervisor through the hospital  at [phone]. ([place] [phone] or [place] [phone]).    Do not eat or drink anything after midnight the night before your surgery, including candy, mints, lifesavers, or chewing gum. Do not drink alcohol 24hrs before your surgery. Try not to smoke at least 24hrs before your surgery.       Follow the pre surgery showering instructions as listed in the “My Surgical Experience Booklet” or otherwise provided by your surgeon's office. Do not use a blade to shave the surgical area 1 week before surgery. It is okay to use a clean electric clippers up to 24 hours before surgery. Do not apply any lotions, creams, including makeup, cologne, deodorant, or perfumes after showering on the day of your surgery. Do not use dry shampoo, hair spray, hair gel, or any type of hair products.     No contact lenses, eye make-up, or artificial eyelashes. Remove nail polish, including gel polish, and any artificial, gel, or acrylic nails if possible. Remove all jewelry including rings and body piercing jewelry.     Wear causal clothing that is easy to take on and off. Consider your type of surgery.    Keep any valuables,  jewelry, piercings at home. Please bring any specially ordered equipment (sling, braces) if indicated.    Arrange for a responsible person to drive you to and from the hospital on the day of your surgery. Please confirm the visitor policy for the day of your procedure when you receive your phone call with an arrival time.     Call the surgeon's office with any new illnesses, exposures, or additional questions prior to surgery.    Please reference your “My Surgical Experience Booklet” for additional information to prepare for your upcoming surgery.

## 2025-02-11 NOTE — ANESTHESIA PREPROCEDURE EVALUATION
Procedure:  HYSTEROSCOPY, DILATION AND CURETTAGE, EXAM UNDER ANESTHESIA AND ALL OTHER INDICATED PROCEDURES (Uterus)    Relevant Problems   ANESTHESIA (within normal limits)      CARDIO (within normal limits)      ENDO (within normal limits)      GI/HEPATIC   (+) Gastroesophageal reflux disease      /RENAL (within normal limits)      GYN (within normal limits)      HEMATOLOGY (within normal limits)      MUSCULOSKELETAL (within normal limits)      NEURO/PSYCH (within normal limits)      PULMONARY   (+) Asthma   (+) Sleep apnea      Other   (+) Morbid obesity (HCC)        Physical Exam    Airway    Mallampati score: II  TM Distance: >3 FB  Neck ROM: full     Dental   No notable dental hx     Cardiovascular      Pulmonary      Other Findings  post-pubertal.      Anesthesia Plan  ASA Score- 3     Anesthesia Type- general with ASA Monitors.         Additional Monitors:     Airway Plan: LMA.           Plan Factors-Exercise tolerance (METS): >4 METS.    Chart reviewed.    Patient summary reviewed.    Patient is not a current smoker.              Induction- intravenous.    Postoperative Plan-         Informed Consent- Anesthetic plan and risks discussed with patient.  I personally reviewed this patient with the CRNA. Discussed and agreed on the Anesthesia Plan with the CRNA..      NPO Status:  No vitals data found for the desired time range.

## 2025-02-12 ENCOUNTER — HOSPITAL ENCOUNTER (OUTPATIENT)
Facility: HOSPITAL | Age: 40
Setting detail: OUTPATIENT SURGERY
Discharge: HOME | End: 2025-02-12
Attending: OBSTETRICS & GYNECOLOGY | Admitting: OBSTETRICS & GYNECOLOGY
Payer: COMMERCIAL

## 2025-02-12 ENCOUNTER — ANESTHESIA (OUTPATIENT)
Dept: PERIOP | Facility: HOSPITAL | Age: 40
End: 2025-02-12
Payer: COMMERCIAL

## 2025-02-12 VITALS
SYSTOLIC BLOOD PRESSURE: 139 MMHG | HEIGHT: 68 IN | HEART RATE: 64 BPM | BODY MASS INDEX: 44.41 KG/M2 | TEMPERATURE: 97.6 F | DIASTOLIC BLOOD PRESSURE: 81 MMHG | WEIGHT: 293 LBS | RESPIRATION RATE: 16 BRPM | OXYGEN SATURATION: 100 %

## 2025-02-12 DIAGNOSIS — G89.18 POSTOPERATIVE PAIN: Primary | ICD-10-CM

## 2025-02-12 DIAGNOSIS — R93.89 ENDOMETRIAL THICKENING ON ULTRASOUND: ICD-10-CM

## 2025-02-12 LAB
ABO GROUP BLD: NORMAL
EXT PREGNANCY TEST URINE: NEGATIVE
EXT. CONTROL: NORMAL
RH BLD: POSITIVE

## 2025-02-12 PROCEDURE — 56606 BIOPSY OF VULVA/PERINEUM: CPT | Performed by: OBSTETRICS & GYNECOLOGY

## 2025-02-12 PROCEDURE — 88312 SPECIAL STAINS GROUP 1: CPT | Performed by: PATHOLOGY

## 2025-02-12 PROCEDURE — 58558 HYSTEROSCOPY BIOPSY: CPT | Performed by: OBSTETRICS & GYNECOLOGY

## 2025-02-12 PROCEDURE — 81025 URINE PREGNANCY TEST: CPT | Performed by: OBSTETRICS & GYNECOLOGY

## 2025-02-12 PROCEDURE — 88341 IMHCHEM/IMCYTCHM EA ADD ANTB: CPT | Performed by: PATHOLOGY

## 2025-02-12 PROCEDURE — 88305 TISSUE EXAM BY PATHOLOGIST: CPT | Performed by: PATHOLOGY

## 2025-02-12 PROCEDURE — 88342 IMHCHEM/IMCYTCHM 1ST ANTB: CPT | Performed by: PATHOLOGY

## 2025-02-12 PROCEDURE — NC001 PR NO CHARGE: Performed by: OBSTETRICS & GYNECOLOGY

## 2025-02-12 PROCEDURE — 56605 BIOPSY OF VULVA/PERINEUM: CPT | Performed by: OBSTETRICS & GYNECOLOGY

## 2025-02-12 RX ORDER — CIPROFLOXACIN 2 MG/ML
400 INJECTION, SOLUTION INTRAVENOUS
Status: COMPLETED | OUTPATIENT
Start: 2025-02-12 | End: 2025-02-12

## 2025-02-12 RX ORDER — KETOROLAC TROMETHAMINE 30 MG/ML
INJECTION, SOLUTION INTRAMUSCULAR; INTRAVENOUS AS NEEDED
Status: DISCONTINUED | OUTPATIENT
Start: 2025-02-12 | End: 2025-02-12

## 2025-02-12 RX ORDER — FENTANYL CITRATE 50 UG/ML
INJECTION, SOLUTION INTRAMUSCULAR; INTRAVENOUS AS NEEDED
Status: DISCONTINUED | OUTPATIENT
Start: 2025-02-12 | End: 2025-02-12

## 2025-02-12 RX ORDER — FENTANYL CITRATE/PF 50 MCG/ML
25 SYRINGE (ML) INJECTION
Status: COMPLETED | OUTPATIENT
Start: 2025-02-12 | End: 2025-02-12

## 2025-02-12 RX ORDER — GLYCOPYRROLATE 0.2 MG/ML
INJECTION INTRAMUSCULAR; INTRAVENOUS AS NEEDED
Status: DISCONTINUED | OUTPATIENT
Start: 2025-02-12 | End: 2025-02-12

## 2025-02-12 RX ORDER — ALBUTEROL SULFATE 0.83 MG/ML
2.5 SOLUTION RESPIRATORY (INHALATION) EVERY 4 HOURS PRN
Status: DISCONTINUED | OUTPATIENT
Start: 2025-02-12 | End: 2025-02-12 | Stop reason: HOSPADM

## 2025-02-12 RX ORDER — HYDROMORPHONE HCL/PF 1 MG/ML
0.5 SYRINGE (ML) INJECTION
Status: DISCONTINUED | OUTPATIENT
Start: 2025-02-12 | End: 2025-02-12 | Stop reason: HOSPADM

## 2025-02-12 RX ORDER — MAGNESIUM HYDROXIDE 1200 MG/15ML
LIQUID ORAL AS NEEDED
Status: DISCONTINUED | OUTPATIENT
Start: 2025-02-12 | End: 2025-02-12 | Stop reason: HOSPADM

## 2025-02-12 RX ORDER — OXYCODONE AND ACETAMINOPHEN 5; 325 MG/1; MG/1
1 TABLET ORAL EVERY 4 HOURS PRN
Refills: 0 | Status: DISCONTINUED | OUTPATIENT
Start: 2025-02-12 | End: 2025-02-12 | Stop reason: HOSPADM

## 2025-02-12 RX ORDER — PROPOFOL 10 MG/ML
INJECTION, EMULSION INTRAVENOUS AS NEEDED
Status: DISCONTINUED | OUTPATIENT
Start: 2025-02-12 | End: 2025-02-12

## 2025-02-12 RX ORDER — OXYCODONE AND ACETAMINOPHEN 5; 325 MG/1; MG/1
1 TABLET ORAL EVERY 4 HOURS PRN
Qty: 10 TABLET | Refills: 0 | Status: SHIPPED | OUTPATIENT
Start: 2025-02-12 | End: 2025-02-22

## 2025-02-12 RX ORDER — METRONIDAZOLE 500 MG/100ML
500 INJECTION, SOLUTION INTRAVENOUS
Status: COMPLETED | OUTPATIENT
Start: 2025-02-12 | End: 2025-02-12

## 2025-02-12 RX ORDER — IBUPROFEN 600 MG/1
600 TABLET, FILM COATED ORAL EVERY 6 HOURS PRN
Qty: 30 TABLET | Refills: 1 | Status: SHIPPED | OUTPATIENT
Start: 2025-02-12

## 2025-02-12 RX ORDER — SODIUM CHLORIDE, SODIUM LACTATE, POTASSIUM CHLORIDE, CALCIUM CHLORIDE 600; 310; 30; 20 MG/100ML; MG/100ML; MG/100ML; MG/100ML
125 INJECTION, SOLUTION INTRAVENOUS CONTINUOUS
Status: DISCONTINUED | OUTPATIENT
Start: 2025-02-12 | End: 2025-02-12 | Stop reason: HOSPADM

## 2025-02-12 RX ORDER — ACETAMINOPHEN 325 MG/1
650 TABLET ORAL EVERY 6 HOURS PRN
Status: DISCONTINUED | OUTPATIENT
Start: 2025-02-12 | End: 2025-02-12 | Stop reason: HOSPADM

## 2025-02-12 RX ORDER — SODIUM CHLORIDE, SODIUM LACTATE, POTASSIUM CHLORIDE, CALCIUM CHLORIDE 600; 310; 30; 20 MG/100ML; MG/100ML; MG/100ML; MG/100ML
INJECTION, SOLUTION INTRAVENOUS CONTINUOUS PRN
Status: DISCONTINUED | OUTPATIENT
Start: 2025-02-12 | End: 2025-02-12

## 2025-02-12 RX ORDER — PROMETHAZINE HYDROCHLORIDE 25 MG/ML
6.25 INJECTION, SOLUTION INTRAMUSCULAR; INTRAVENOUS ONCE AS NEEDED
Status: DISCONTINUED | OUTPATIENT
Start: 2025-02-12 | End: 2025-02-12 | Stop reason: HOSPADM

## 2025-02-12 RX ORDER — METOCLOPRAMIDE HYDROCHLORIDE 5 MG/ML
INJECTION INTRAMUSCULAR; INTRAVENOUS AS NEEDED
Status: DISCONTINUED | OUTPATIENT
Start: 2025-02-12 | End: 2025-02-12

## 2025-02-12 RX ORDER — LABETALOL HYDROCHLORIDE 5 MG/ML
INJECTION, SOLUTION INTRAVENOUS AS NEEDED
Status: DISCONTINUED | OUTPATIENT
Start: 2025-02-12 | End: 2025-02-12

## 2025-02-12 RX ORDER — ONDANSETRON 2 MG/ML
INJECTION INTRAMUSCULAR; INTRAVENOUS AS NEEDED
Status: DISCONTINUED | OUTPATIENT
Start: 2025-02-12 | End: 2025-02-12

## 2025-02-12 RX ORDER — LIDOCAINE HYDROCHLORIDE 10 MG/ML
INJECTION, SOLUTION EPIDURAL; INFILTRATION; INTRACAUDAL; PERINEURAL AS NEEDED
Status: DISCONTINUED | OUTPATIENT
Start: 2025-02-12 | End: 2025-02-12

## 2025-02-12 RX ORDER — BUPIVACAINE HYDROCHLORIDE 2.5 MG/ML
INJECTION, SOLUTION EPIDURAL; INFILTRATION; INTRACAUDAL AS NEEDED
Status: DISCONTINUED | OUTPATIENT
Start: 2025-02-12 | End: 2025-02-12 | Stop reason: HOSPADM

## 2025-02-12 RX ORDER — MIDAZOLAM HYDROCHLORIDE 2 MG/2ML
INJECTION, SOLUTION INTRAMUSCULAR; INTRAVENOUS AS NEEDED
Status: DISCONTINUED | OUTPATIENT
Start: 2025-02-12 | End: 2025-02-12

## 2025-02-12 RX ADMIN — PROPOFOL 140 MCG/KG/MIN: 10 INJECTION, EMULSION INTRAVENOUS at 09:52

## 2025-02-12 RX ADMIN — SODIUM CHLORIDE, SODIUM LACTATE, POTASSIUM CHLORIDE, AND CALCIUM CHLORIDE: .6; .31; .03; .02 INJECTION, SOLUTION INTRAVENOUS at 09:17

## 2025-02-12 RX ADMIN — METRONIDAZOLE: 500 INJECTION, SOLUTION INTRAVENOUS at 09:55

## 2025-02-12 RX ADMIN — HYDROMORPHONE HYDROCHLORIDE 0.5 MG: 1 INJECTION, SOLUTION INTRAMUSCULAR; INTRAVENOUS; SUBCUTANEOUS at 11:30

## 2025-02-12 RX ADMIN — FENTANYL CITRATE 25 MCG: 50 INJECTION INTRAMUSCULAR; INTRAVENOUS at 10:08

## 2025-02-12 RX ADMIN — FENTANYL CITRATE 25 MCG: 50 INJECTION, SOLUTION INTRAMUSCULAR; INTRAVENOUS at 11:18

## 2025-02-12 RX ADMIN — OXYCODONE HYDROCHLORIDE AND ACETAMINOPHEN 1 TABLET: 5; 325 TABLET ORAL at 12:58

## 2025-02-12 RX ADMIN — ONDANSETRON 4 MG: 2 INJECTION, SOLUTION INTRAMUSCULAR; INTRAVENOUS at 09:54

## 2025-02-12 RX ADMIN — METOCLOPRAMIDE 5 MG: 5 INJECTION, SOLUTION INTRAMUSCULAR; INTRAVENOUS at 09:56

## 2025-02-12 RX ADMIN — PROPOFOL 200 MG: 10 INJECTION, EMULSION INTRAVENOUS at 09:51

## 2025-02-12 RX ADMIN — GLYCOPYRROLATE 0.1 MG: 0.2 INJECTION INTRAMUSCULAR; INTRAVENOUS at 09:41

## 2025-02-12 RX ADMIN — FENTANYL CITRATE 50 MCG: 50 INJECTION INTRAMUSCULAR; INTRAVENOUS at 09:49

## 2025-02-12 RX ADMIN — LIDOCAINE HYDROCHLORIDE 50 MG: 10 INJECTION, SOLUTION EPIDURAL; INFILTRATION; INTRACAUDAL; PERINEURAL at 09:51

## 2025-02-12 RX ADMIN — CIPROFLOXACIN: 400 INJECTION, SOLUTION INTRAVENOUS at 09:59

## 2025-02-12 RX ADMIN — FENTANYL CITRATE 25 MCG: 50 INJECTION, SOLUTION INTRAMUSCULAR; INTRAVENOUS at 11:05

## 2025-02-12 RX ADMIN — FENTANYL CITRATE 25 MCG: 50 INJECTION INTRAMUSCULAR; INTRAVENOUS at 09:59

## 2025-02-12 RX ADMIN — LABETALOL HYDROCHLORIDE 10 MG: 5 INJECTION, SOLUTION INTRAVENOUS at 10:30

## 2025-02-12 RX ADMIN — HYDROMORPHONE HYDROCHLORIDE 0.5 MG: 1 INJECTION, SOLUTION INTRAMUSCULAR; INTRAVENOUS; SUBCUTANEOUS at 11:58

## 2025-02-12 RX ADMIN — LABETALOL HYDROCHLORIDE 5 MG: 5 INJECTION, SOLUTION INTRAVENOUS at 10:27

## 2025-02-12 RX ADMIN — KETOROLAC TROMETHAMINE 15 MG: 30 INJECTION, SOLUTION INTRAMUSCULAR; INTRAVENOUS at 10:34

## 2025-02-12 RX ADMIN — FENTANYL CITRATE 25 MCG: 50 INJECTION, SOLUTION INTRAMUSCULAR; INTRAVENOUS at 11:21

## 2025-02-12 RX ADMIN — FENTANYL CITRATE 25 MCG: 50 INJECTION, SOLUTION INTRAMUSCULAR; INTRAVENOUS at 11:14

## 2025-02-12 RX ADMIN — MIDAZOLAM 2 MG: 1 INJECTION INTRAMUSCULAR; INTRAVENOUS at 09:41

## 2025-02-12 NOTE — DISCHARGE INSTR - AVS FIRST PAGE
Call for any fevers >101F, or chills/nausea/vomiting >24hrs, pain not well controlled with pain meds, or vaginal bleeding >2pads/hr.      Call an ambulance or have someone drive you to the nearest ER with severe pain, very heavy bleeding, or passing out.      NOTHING per vagina for the next 3 weeks or until all bleeding stops.      You have 2 small stitches at the opening of your vaginal area.      Follow up with Dr. Santos as scheduled.

## 2025-02-12 NOTE — ANESTHESIA POSTPROCEDURE EVALUATION
Post-Op Assessment Note    CV Status:  Stable    Pain management: adequate       Mental Status:  Awake and sleepy   Hydration Status:  Euvolemic   PONV Controlled:  Controlled   Airway Patency:  Patent  Two or more mitigation strategies used for obstructive sleep apnea   Post Op Vitals Reviewed: Yes    No anethesia notable event occurred.    Staff: CRNA           Last Filed PACU Vitals:  Vitals Value Taken Time   Temp 97.6 °F (36.4 °C) 02/12/25 1058   Pulse 80 02/12/25 1059   /89 02/12/25 1058   Resp 22 02/12/25 1059   SpO2 99 % 02/12/25 1059   Vitals shown include unfiled device data.

## 2025-02-12 NOTE — ANESTHESIA POSTPROCEDURE EVALUATION
Post-Op Assessment Note    CV Status:  Stable    Pain management: adequate       Mental Status:  Alert and awake   Hydration Status:  Euvolemic   PONV Controlled:  Controlled   Airway Patency:  Patent     Post Op Vitals Reviewed: Yes    No anethesia notable event occurred.    Staff: Anesthesiologist, CRNA           Last Filed PACU Vitals:  Vitals Value Taken Time   Temp 97.6 °F (36.4 °C) 02/12/25 1058   Pulse 56 02/12/25 1126   /84 02/12/25 1122   Resp 5 02/12/25 1126   SpO2 94 % 02/12/25 1126   Vitals shown include unfiled device data.    Modified Adrienne:     Vitals Value Taken Time   Activity 2 02/12/25 1115   Respiration 2 02/12/25 1115   Circulation 2 02/12/25 1115   Consciousness 1 02/12/25 1115   Oxygen Saturation 2 02/12/25 1115     Modified Adrienne Score: 9

## 2025-02-12 NOTE — H&P
Pre-op History and Physical  Patient is a 39 y.o. female scheduled for EUA, hysteroscopy, D&C. Indications for procedure are thickened endometrium on u/s.     Pt has always had irregular and heavy periods. Symptoms have been stable since that time.      Periods are irregular and hot flushes are getting worse. Daily now with hot flushes.  Periods last 7-10 days.  Went as long as 45 days between periods.          10/31/24 u/s showed:   FINDINGS:     UTERUS:  The uterus is anteverted in position, measuring 8.9 x 4.5 x 5.7 cm.  The uterus has a normal contour and echotexture. Several fibroids in the fundus measuring up to 1.5 cm. Not well visualized due to posterior location.  The cervix appears within normal limits.     ENDOMETRIUM:  The endometrial echo complex has an AP caliber of 20.0 mm.  Not well visualized.     OVARIES/ADNEXA:  Right ovary: 2.4 x 2.2 x 2.0 cm. 5.4 mL.  Ovarian Doppler flow is within normal limits.  No suspicious ovarian or adnexal abnormality.     Left ovary: 4.4 x 3.5 x 2.2 cm. 17.8 mL.  Ovarian Doppler flow is within normal limits.  No suspicious ovarian or adnexal abnormality.     OTHER:  No free fluid or loculated fluid collections.     IMPRESSION:     1.  Few small uterine fibroids.  2.  Endometrium not well visualized due to uterine positioning, approximately 20 mm in thickness.        Met with Dr. Budinetz for egg preservation.         Pertinent Gynecological History:     Menses: irregular occurring approximately every 28-45 days with spotting approximately 5-7 days per month  Bleeding: dysfunctional uterine bleeding  Contraception: none  ELLIOT exposure: denies  Blood transfusions: none  Sexually transmitted diseases: no past history  Preventive screening: 10/2024 pap:  neg/neg  Last mammogram:      Discussed Blood/Blood Products: yes     Menstrual History:  OB History            2    Para   1    Term   1            AB   1    Living   1           SAB   1    IAB        Ectopic         Multiple        Live Births   1                  Menarche age: ~15yo  Patient's last menstrual period was 10/19/2024.     Family History         Family History   Problem Relation Age of Onset    Breast cancer Mother 66         DCIS-resolved w/surgery    Miscarriages / Stillbirths Mother      Diabetes Father      Hypertension Father      Diabetes Paternal Grandmother      Stroke Paternal Grandfather      Breast cancer Paternal Aunt           Medical History        Past Medical History:   Diagnosis Date    Abnormal Pap smear of cervix      Anemia      Asthma      Fibroid      Herpes      Migraine      Miscarriage      Varicella             Current Medications      Current Outpatient Medications:     cetirizine (ZyrTEC) 10 mg tablet, Take 10 mg by mouth daily, Disp: , Rfl:     Tirzepatide (Mounjaro) 2.5 MG/0.5ML SOAJ, Inject 2.5 mg under the skin once a week, Disp: 2 mL, Rfl: 5         Allergies         Allergies   Allergen Reactions    Penicillins Anaphylaxis, Hives, Itching, Other (See Comments), Palpitations, Rash, Shortness Of Breath and Swelling    Cyclobenzaprine Other (See Comments) and Irritability       sensitivity    Methylprednisolone Other (See Comments)       sensitivity    Prednisone Other (See Comments)         Physical Exam  HENT:      Head: Normocephalic and atraumatic.   Cardiovascular:      Rate and Rhythm: Normal rate and regular rhythm.   Pulmonary:      Effort: Pulmonary effort is normal.      Breath sounds: Normal breath sounds.   Abdominal:      Palpations: Abdomen is soft.   Neurological:      Mental Status: She is alert and oriented to person, place, and time.   Psychiatric:         Mood and Affect: Mood normal.         Behavior: Behavior normal.         Thought Content: Thought content normal.         Judgment: Judgment normal.         A/P:       Thickened endometrium on u/s: for EUA, hysteroscopy, D&C, and all other indicated procedures.   Met with Dr. Budinetz for egg preservation.    Plan for wt loss prior to egg preservation (~100 lbs),  eRX for 2.5mg Mounjaro sent.    Patient verbalized understanding of today's discussion with all questions and concerns addressed to her satisfaction.        FERMIN Santos MD, FACOG

## 2025-02-12 NOTE — OP NOTE
OPERATIVE REPORT  PATIENT NAME: Ivania Diallo    :  1985  MRN: [id number]  Pt Location:  OR ROOM 04    SURGERY DATE: 2025    Surgeons and Role:     * FERMIN Santos MD - Primary    Preop Diagnosis:  Endometrial thickening on ultrasound [R93.89]    Post-Op Diagnosis Codes:     * Endometrial thickening on ultrasound [R93.89]  Vulvar lesions      Procedure(s):  HYSTEROSCOPY. DILATION AND CURETTAGE. EXAM UNDER ANESTHESIA AND ALL OTHER INDICATED PROCEDURES. LABIAL LESION REMOVAL    Specimen(s):  ID Type Source Tests Collected by Time Destination   1 : left perineal lesion Tissue Lesion TISSUE EXAM FERMIN Santos MD 2025 1013    2 : [facility] Tissue Endometrium TISSUE EXAM FERMIN Santos MD 2025 1013    3 : right labial lesion Tissue Lesion TISSUE EXAM FERMIN Santos MD 2025 1024        IV fluids:   400cc    Estimated Blood Loss:   20cc    Deficit:   350cc    Drains:  none    Anesthesia Type:   IV Sedation with Anesthesia    Operative Indications:  Endometrial thickening on ultrasound [R93.89]  AUB    Operative Findings:  Mildly thickened endometrium with bilateral ostia easily identified.      2 dark lesions in perineal area:  punch biopsy done on left perineal and right labia minora.     Physical Exam  Genitourinary:              Complications:   None    Procedure and Technique:    Hysteroscopy, D&C:     Ivania Diallo is a 40yo who presented for a hysteroscopy, D&C for abnormal uterine bleeding with noted thickened endometrium on ultrasound imaging.  Informed consent was obtained after all risks/benefits/alternatives were discussed with the patient and all questions, concerns, and issues were addressed to patient's satisfaction.  Pt agreed and consented to proceed with the listed procedure above.      Details of the Procedure:    After informed consent was verified, patient was brought to the operating room where she was identified by her name and birthdate.  A timeout procedure was  performed in which all team members present were in agreement .  General anesthesia was then introduced.  Once anesthesia was adequate, pt was placed in dorsolithotomy position.  She was then prepped and draped in normal sterile fashion and a second timeout procedure was then performed with all team members present in agreement.     A bivalve speculum was then placed into the vagina, however visualization of the cervix was difficult and Braskie retractors were then used for exposure where the anterior and posterior lips of the cervix were then grasped with Allis clamps.  She was then sounded to 12 centimeters noting anteverted uterus.  The speculum was then removed and replaced with a weighted speculum to her vagina posteriorly.  She was then dilated to 18 [place]ish and a Symphion hysteroscope was then used under direct visualization to visualize the endometrial cavity.  Bilateral ostia easily visualized.  No overt polypoid structures or lesions were noted within the endometrial cavity.    The hysteroscope was removed and a #1 sharp curette was then used to curettage the endometrium.  This was repeated until all four quadrants were noted to be gritty, noting moderate amount of curettaged tissue.  The hysteroscope was once again advanced into the endometrial cavity under direct visualization noting a well curettage endometrium.      There were no bowel, bladder, or ureteral injuries noted.  A total of 10 cc of quarter percent Marcaine was then used as a paracervical block injected at the 2, 4, 8, 10:00 positions.  Minimal bleeding noted.  All instruments were then removed from vagina.       Attention was turned to the vulvar area where two 5 to 6 mm hyperpigmented lesions were noted 1 in the right labia and 1 in the left perineal areas.  A 3 mm punch biopsy was used to biopsy this area taking 2 passes at each lesion.  3-0 chromic suture was then used to reapproximate the biopsy sites.  Excellent hemostasis was noted.   Both areas were infiltrated with quarter percent Marcaine.    Patient was then taken out of dorsolithotomy position.      All sponge lap and needle counts were correct x3.  Patient tolerated the procedure without any complications.  The patient received Cipro and Flagyl antibiotics prior to the case with her anaphylactic allergic reaction to penicillin. The patient was then transferred to PACU for further recovery from anesthesia.       Patient Disposition:  PACU          SIGNATURE: SÁNCHEZ Santos MD  DATE: February 12, 2025  TIME: 11:34 AM

## 2025-02-14 ENCOUNTER — NURSE TRIAGE (OUTPATIENT)
Age: 40
End: 2025-02-14

## 2025-02-14 NOTE — TELEPHONE ENCOUNTER
ESC on call provider Dr. Dm Alaniz-I feel this is normal from the retraction that occurs. Ice, stay off of bottom and decrease activity for now. If the labia continue to get larger she needs to go to ED.   Phone call back to patient and reviewed providers notes and recommendations.   Patient was thankful for call back, patient requested her surgeon call her back if possible.   msg sent to Dr. Bautista

## 2025-02-14 NOTE — TELEPHONE ENCOUNTER
"2/12/25 POD #2 Hysteroscopy D&C with Dr. Bautista  Patient c/o constant soreness with vaginal pain  6/10 with sitting, No swelling or redness, light red spotting very scant. No difficulty urinating, no burning.   Pain is better lying down, using ice   Percocet used only on the first day only.   No fever.No n/v/d.  Patient advised to call back if pain increases, bleeding increases or any other concerning symptoms.    Patient asking if she should continue with cool compress or switch to heat?   She wanted to know if  Thinks this type of pain is expected?     ESC sent to Dr. Santos        Answer Assessment - Initial Assessment Questions  1. SYMPTOM: \"What's the main symptom you're concerned about?\" (e.g., pain, fever, vomiting)      Pain with Percocet  2. ONSET: \"When did pain symptom  start?\"      Since surgery   3. SURGERY: \"What surgery did you have?\"      2/12/25  4. DATE of SURGERY: \"When was the surgery?\"       Hysteroscopy   5. ANESTHESIA: \"What type of anesthesia did you have?\" (e.g., general, spinal, epidural, local)      General   6. DRAINS: \"Were any drains place in or around the wound?\" (e.g., Hemovac, Rony-Hathaway, Penrose)      No   7. PAIN: \"Is there any pain?\" If Yes, ask: \"How bad is it?\"  (Scale 1-10; or mild, moderate, severe)      6/10   8. FEVER: \"Do you have a fever?\" If Yes, ask: \"What is your temperature, how was it measured, and when did it start?\"      none  9. VOMITING: \"Is there any vomiting?\" If Yes, ask: \"How many times?\"      Denies   10. BLEEDING: \"Is there any bleeding?\" If Yes, ask: \"How much?\" and \"Where?\"        Light spotting   11. OTHER SYMPTOMS: \"Do you have any other symptoms?\" (e.g., drainage from wound, painful urination, constipation)        Denies    Protocols used: Post-Op Symptoms and Questions-Adult-[place]    "

## 2025-02-19 PROCEDURE — 88305 TISSUE EXAM BY PATHOLOGIST: CPT | Performed by: PATHOLOGY

## 2025-02-19 PROCEDURE — 88342 IMHCHEM/IMCYTCHM 1ST ANTB: CPT | Performed by: PATHOLOGY

## 2025-02-19 PROCEDURE — 88341 IMHCHEM/IMCYTCHM EA ADD ANTB: CPT | Performed by: PATHOLOGY

## 2025-02-19 PROCEDURE — 88312 SPECIAL STAINS GROUP 1: CPT | Performed by: PATHOLOGY

## 2025-02-21 ENCOUNTER — TELEPHONE (OUTPATIENT)
Age: 40
End: 2025-02-21

## 2025-02-21 LAB
GENE DIS ANL INTERP-IMP: NORMAL
INTERPRETATION: NORMAL

## 2025-02-21 NOTE — TELEPHONE ENCOUNTER
Pt calling in asking for tissue exam results, pt was notified that the provider hasn't yet reviewed the results, once the provider reviews the results and gives further information, the patient will be notified, pt verbalized understanding, no further questions at this time.

## 2025-02-24 ENCOUNTER — RESULTS FOLLOW-UP (OUTPATIENT)
Age: 40
End: 2025-02-24

## 2025-02-24 ENCOUNTER — TELEPHONE (OUTPATIENT)
Dept: GENETICS | Facility: CLINIC | Age: 40
End: 2025-02-24

## 2025-02-24 NOTE — TELEPHONE ENCOUNTER
Patient calling in stating she has not received recommendations for biopsy results 2/12. Routing to provider for review.

## 2025-02-24 NOTE — TELEPHONE ENCOUNTER
Post-Test Genetic Counseling Consult Note  Today I spoke with Ivania over the phone to review the results of her genetic test for hereditary cancer. We met previously on 1/23/25 for pre-test counseling.  A copy of this consult note and genetic test result will be shared with the patient.      SUMMARY:    Test(s): CustomNext: Cancer® +RNAinsight® (59 genes): APC, LISBETH, AXIN2, BAP1, BARD1, BMPR1A, BRCA1, BRCA2, BRIP1, CDH1, CDK4, CDKN1B, CDKN2A, CHEK2, CTNNA1, DICER1, EGLN1, EPCAM, FH, FLCN, GREM1, HOXB13, KIF1B, KIT, MAX, MEN1, MET, MITF, MLH1, MSH2, MSH3, MSH6, MUTYH, NF1, NTHL1, PALB2, PDGFRA PMS2, POLD1, POLE, POT1, PTEN, RAD51C, RAD51D, RB1, RET, SDHA, SDHAF2, SDHB, SDHC, SDHD, SMAD4, SMARCA4, STK11, TDPR498, TP53, TSC1, TSC2, VHL     Result: Negative - No Clinically Significant Variants Detected      Assessment:   A negative result significantly reduces the likelihood that Ivania has a hereditary cancer syndrome. However, this testing is unable to completely rule out the presence of hereditary cancer. It remains possible that:  There is a variant in an area of a gene which was not tested or there is a variant not detectable due to technical limitations of this test.     There is a variant in another gene that was not included in this test or in a gene not known to be linked to cancer or tumors.   A family member has a genetic variant that the patient did not inherit.   The cancer in the family is sporadic and is related to non-hereditary factors.     Risk based on Family History:  Ivania  may still be at increased risk for cancer based on the family history. Therefore, Ivania should continue to follow with her healthcare providers to ensure that she is receiving the appropriate screening.      During her pretest appointment, Ivania's genetic counselor reviewed that she may be at an increased risk for breast cancer given her family history and personal risk factors. For this reason a referral was made to surgical  oncology. Ivania has not yet scheduled an appointment with the  high-risk specialists to discuss further screening. Ivania was encouraged to schedule this appointment and she reports that she has their contact information and plans to call to schedule.     Risks and Testing for Family Members:  Despite a negative result, Ivania's first-degree relatives may be at increased risk for the cancers based on the family history. We recommend they discuss genetic testing, screening, and management recommendations with their healthcare providers.    At this time we do not recommend testing for Ivania 's children based on her negative test result.  Ivania 's children still need to consider the history of cancer on the other side of their family when determining their risks.     If Ivania has any affected family members with a cancer diagnosis, especially at a young age, they may still consider genetic testing. Relatives who wish to pursue genetic testing can reach out to the [facility] [phone] to schedule an appointment or visit [email or web address] to identify a local genetic counselor.      Additional Information:  A healthy lifestyle will improve overall health and reduce risk for illness. Eating a healthy diet and exercising for 4 hours per week is recommended. Both diet and exercise have been shown to help maintain a healthy weight. Postmenopausal women who are overweight are at higher risk for breast cancer. Moderate to heavy alcohol use can increase the risk for some cancers. Smoking cigarettes can also increase risk for breast, lung, prostate, pancreatic and other cancers.      Plan:   Recommendations for Ivania are outlined below based on her family history, however the surveillance and medical management should continue as clinically indicated and as determined appropriate by her healthcare providers.    Breast cancer screening per the NCCN Breast Cancer Risk Reduction as of 02/24/25  Screening for  women who have a lifetime risk of >20% as defined by models that are largely dependent on family history:  Breast awareness  Clinical encounter every 6-12 months   Consider referral to a breast specialist   Annual screening mammogram.  Tomosynthesis is recommended if available beginning 10 years prior to the youngest family member but not less than age 30 years  Annual breast MRI to begin 10 years prior to the youngest family member but not less than age 25 years  Consider whole breast ultrasound or contrast-enhanced mammography for those who qualify for but cannot undergo MRI    Negative Result: Ivania was strongly encouraged to contact us regarding any changes in her personal or family history of cancer as these changes could alter our recommendation regarding genetic testing and/or cancer screening.

## 2025-03-03 ENCOUNTER — TELEPHONE (OUTPATIENT)
Age: 40
End: 2025-03-03

## 2025-03-03 NOTE — TELEPHONE ENCOUNTER
Called pt to discuss results and answer questions.     Mom just diagnosed w/ uterine CA.  H/o breast CA.     Genetics for pt are all negative.  Pt would like to consider hysterectomy and bilateral mastectomy.    Discussed wt loss.      Will discuss further at f/u appt.    Will need to keep an eye on both perineum biopsy sites.     Patient verbalized understanding of today's discussion with all questions and concerns addressed to her satisfaction.      //RMR

## 2025-03-11 ENCOUNTER — OFFICE VISIT (OUTPATIENT)
Age: 40
End: 2025-03-11

## 2025-03-11 VITALS — WEIGHT: 293 LBS | BODY MASS INDEX: 57.09 KG/M2

## 2025-03-11 DIAGNOSIS — Z80.49 FAMILY HISTORY OF UTERINE CANCER: ICD-10-CM

## 2025-03-11 DIAGNOSIS — N93.9 ABNORMAL UTERINE BLEEDING (AUB): Primary | ICD-10-CM

## 2025-03-11 DIAGNOSIS — Z98.890 POST-OPERATIVE STATE: ICD-10-CM

## 2025-03-11 DIAGNOSIS — Z80.3 FAMILY HISTORY OF BREAST CANCER: ICD-10-CM

## 2025-03-11 PROCEDURE — 99024 POSTOP FOLLOW-UP VISIT: CPT | Performed by: OBSTETRICS & GYNECOLOGY

## 2025-03-11 NOTE — PROGRESS NOTES
What we talked about today:    Patient Instructions   Await period to f/u with  for egg freezing labs and workup.   F/u yearly for 2 spots at vaginal opening.    Will work on wt loss over the next 12-24mos.    Await pathology from mom, as that will determine for her genetic testing (breast and uterine CA).  Patient verbalized understanding of today's discussion with all questions and concerns addressed to her satisfaction.            Assessment/Plan:    1. Abnormal uterine bleeding (AUB)  2. Post-operative state  3. Family history of breast cancer  4. Family history of uterine cancer          Subjective:      Patient ID: Ivania Diallo is a 39 y.o. female, presents today complaining of f/u post op    HPI:    Pt states had initial consult with Dr. Budinetz for egg freezing.  Waiting for period to get labs with her.     Mom with new diagnosis of uterine CA.   Pt discussed ppx hysterectomy and mastectomy.        The following portions of the patient's history were reviewed and updated as appropriate: allergies, current medications, past family history, past medical history, past social history, past surgical history, and problem list.      Review of Systems   Constitutional:  Negative for chills, fatigue and fever.   HENT: Negative.     Eyes: Negative.    Respiratory: Negative.     Cardiovascular: Negative.    Gastrointestinal: Negative.  Negative for abdominal distention and abdominal pain.   Endocrine: Negative.    Musculoskeletal: Negative.    Skin: Negative.    Allergic/Immunologic: Negative.    Neurological: Negative.    Hematological: Negative.    Psychiatric/Behavioral: Negative.     All other systems reviewed and are negative.            Objective:      Wt (!) 168 kg (370 lb)   LMP 02/07/2025   BMI 57.09 kg/m²        Physical Exam  Vitals reviewed.   Constitutional:       General: She is not in acute distress.     Appearance: Normal appearance. She is not ill-appearing.   HENT:      Head: Normocephalic and  atraumatic.   Eyes:      Extraocular Movements: Extraocular movements intact.   Pulmonary:      Effort: Pulmonary effort is normal.   Musculoskeletal:         General: Normal range of motion.      Cervical back: Normal range of motion and neck supple.   Skin:     General: Skin is warm and dry.   Neurological:      Mental Status: She is alert and oriented to person, place, and time.   Psychiatric:         Mood and Affect: Mood normal.         Behavior: Behavior normal.         Thought Content: Thought content normal.         Judgment: Judgment normal.           GYN exam: NEFG.  Bx areas well healed.  No lesions at borders.      Wetprep was not performed today.          FERMIN Santos MD, FACOG

## 2025-04-30 DIAGNOSIS — E66.01 MORBID OBESITY WITH BMI OF 50.0-59.9, ADULT (HCC): Primary | ICD-10-CM

## 2025-04-30 RX ORDER — TIRZEPATIDE 5 MG/.5ML
5 INJECTION, SOLUTION SUBCUTANEOUS WEEKLY
Qty: 2 ML | Refills: 5 | Status: SHIPPED | OUTPATIENT
Start: 2025-04-30 | End: 2025-10-27

## 2025-04-30 NOTE — PROGRESS NOTES
Total wt loss in 2mos:  10 lbs!    Will increase to 5mg Zepbound from [facility].      //RMR

## 2025-05-27 DIAGNOSIS — G47.30 SLEEP APNEA IN ADULT: Primary | ICD-10-CM

## 2025-05-27 DIAGNOSIS — E66.01 MORBID OBESITY WITH BMI OF 50.0-59.9, ADULT (HCC): ICD-10-CM

## 2025-05-27 RX ORDER — TIRZEPATIDE 5 MG/.5ML
5 INJECTION, SOLUTION SUBCUTANEOUS WEEKLY
Qty: 2 ML | Refills: 5 | Status: SHIPPED | OUTPATIENT
Start: 2025-05-27 | End: 2025-05-27 | Stop reason: SDUPTHER

## 2025-05-27 RX ORDER — TIRZEPATIDE 5 MG/.5ML
5 INJECTION, SOLUTION SUBCUTANEOUS WEEKLY
Qty: 2 ML | Refills: 5 | Status: SHIPPED | OUTPATIENT
Start: 2025-05-27 | End: 2025-11-23

## 2025-05-30 ENCOUNTER — TELEPHONE (OUTPATIENT)
Age: 40
End: 2025-05-30

## 2025-05-30 NOTE — TELEPHONE ENCOUNTER
"Pt calling in regarding prior authorization for zepbound from pharmacy. Contacted Ramonita at the , as per Ramonita, no prior auth was faxed. Pt stating that she \"knows for a fact\" it was sent over by the pharmacy. Pt is notified that there is no documentation for a prior auth.   "

## 2025-05-30 NOTE — TELEPHONE ENCOUNTER
Please advise if the dx code for the Zepbound PA is Sleep Apnea, or Morbid obesity, if it is Sleep apnea there needs to be documentation of this is the ov note, or if the pt had a sleep study the insurance company is going to require those results. There is no form from the pharmacy scanned into the chart, so please advise on that as well. There also needs to be an updated OV note with the weight of the pt, and it being documented. I can not use the last ov note on 3/11 as that was a Post op visit .

## 2025-06-02 NOTE — TELEPHONE ENCOUNTER
Patient calling for udpate on PA for Zepbound. States she has been out of meds x2 weeks. Please update patient and complete.

## 2025-06-03 NOTE — TELEPHONE ENCOUNTER
Patient is calling to follow up on medications prior auth for her Zepbound. Reviewed chart and provided update. She is aware we are awaiting response as of this morning from Dr. Santos regarding clarification on diagnosis. Reviewed note from PA team noting the need for supporting documentation for sleep apnea diagnosis - sleep study. Patient will contact her pulmonologist to fax copy of her sleep study. Provided office fax number. Additionally made patient aware per PA team note, updated weight and office visit note is being requested for PA from insurance. Made patient aware note from 3/11 will not be accepted. Patient states she is not driving an hour and paying her copay to stand on a scale. Patient verbalizes frustration and dissatisfaction with time its taking to complete the PA process and reports she's been out of her medications for nearly 2 weeks. She states she expects the PA to completed by end of business day today. Patient states she's been calling about the medication for 3 weeks.    Spoke with practice manage Jessica to provide update and patient concern.     Message to Dr. Santos.

## 2025-06-03 NOTE — TELEPHONE ENCOUNTER
The PA team can submit the PA for the Zepbound using the DX code of Morbid obesity due to the pt's BMI,I can use the weights the pt submitted as well,  but there needs to be an addendum made to the office note from 3/11 documenting that there is a need for the weight loss medication due to the BMI, what medications the pt has tried and failed for the dx of morbid obesity,that life style changes have been made,  I can also use the dx of sleep apnea and any other co- morbilities that the pt may have, but this all needs to be documented in that office note, with out clear documentation the PA will be denied by the insurance company and then there will be an even longer wait time for the PT.   Once the PA team receives the needed documentation we can submit the PA as URGENT.   I also sent the message to the office/ provider on 5/30 requesting clarification and there was no response until today. Please make the pt aware that the PA team needs this information in order for the PA to be submitted.

## 2025-06-04 NOTE — TELEPHONE ENCOUNTER
"Pt calling in stating that she would like to know when her prior auth will be completed, pt stating that she would like a timeline of when her prior authorization will be completed, pt is notified a message can be sent to the prior auth team to complete it on their end, pt stating \"I have waited 2 weeks and I would like to know when this will be completed, can you just ask somebody\". Contacted Ramonita In the office, as per Ramonita information is completed on the office's end, and that now the prior auth team will do their documentation for the insurance company. Pt is expressing frustration and would like an exact time frame of when it will be sent to the insurance company, pt is notified that a message will be sent to prior auth team. Pt would like a call back directly from prior auth team.   "

## 2025-06-05 NOTE — TELEPHONE ENCOUNTER
"Patient calling in to check on the status of her prior auth for zepbound.  She has called several time and is expressing extreme frustration in the process.  Advised it is in process through prior auth team and I will try to find her an update.   Offered apologies.  She is \"fully expecting a call back today as to why this is taking so long\".  "

## 2025-06-06 NOTE — TELEPHONE ENCOUNTER
I called and left a message for the pt informing her that the PA was submitted, and markd urgent, that the insurance company will respond between 7-21 days. Advised to call the office with any further questions

## 2025-06-06 NOTE — TELEPHONE ENCOUNTER
PA for Zepbound SUBMITTED to [facility] [facility]     via    [x]CMM-KEY: [id number]  []Surescripts-Case ID #   []Availity-Auth ID # NDC #   []Faxed to plan   []Other website   []Phone call Case ID #     [x]PA sent as URGENT    All office notes, labs and other pertaining documents and studies sent. Clinical questions answered. Awaiting determination from insurance company.     Turnaround time for your insurance to make a decision on your Prior Authorization can take 7-21 business days.

## (undated) DEVICE — SYRINGE 10ML LL CONTROL TOP

## (undated) DEVICE — TISSUE REMOVAL SYSTEM FLUID MANAGEMENT ACCESSORIES: Brand: SYMPHION

## (undated) DEVICE — TELFA NON-ADHERENT ABSORBENT DRESSING: Brand: TELFA

## (undated) DEVICE — WET SKIN PREP TRAY: Brand: MEDLINE INDUSTRIES, INC.

## (undated) DEVICE — NEEDLE SPINAL 22G X 3.5IN  QUINCKE

## (undated) DEVICE — STRL ALLENTOWN HYSTEROSCOPY PK: Brand: CARDINAL HEALTH

## (undated) DEVICE — GLOVE INDICATOR PI UNDERGLOVE SZ 6.5 BLUE

## (undated) DEVICE — LUBRICANT JELLY SURGILUBE TUBE 4OZ FLIP TOP

## (undated) DEVICE — GLOVE SRG LF STRL BGL SKNSNS 6.5 PF